# Patient Record
Sex: FEMALE | Race: WHITE | Employment: FULL TIME | URBAN - METROPOLITAN AREA
[De-identification: names, ages, dates, MRNs, and addresses within clinical notes are randomized per-mention and may not be internally consistent; named-entity substitution may affect disease eponyms.]

---

## 2022-09-26 ENCOUNTER — OFFICE VISIT (OUTPATIENT)
Dept: PRIMARY CARE CLINIC | Age: 52
End: 2022-09-26
Payer: COMMERCIAL

## 2022-09-26 VITALS
DIASTOLIC BLOOD PRESSURE: 74 MMHG | HEART RATE: 99 BPM | HEIGHT: 66 IN | BODY MASS INDEX: 23.95 KG/M2 | SYSTOLIC BLOOD PRESSURE: 122 MMHG | OXYGEN SATURATION: 100 % | TEMPERATURE: 97.4 F | WEIGHT: 149 LBS

## 2022-09-26 DIAGNOSIS — Z00.00 ROUTINE HEALTH MAINTENANCE: ICD-10-CM

## 2022-09-26 DIAGNOSIS — R07.89 CHEST DISCOMFORT: ICD-10-CM

## 2022-09-26 DIAGNOSIS — K21.9 GASTROESOPHAGEAL REFLUX DISEASE, UNSPECIFIED WHETHER ESOPHAGITIS PRESENT: ICD-10-CM

## 2022-09-26 DIAGNOSIS — Z83.49 FAMILY HISTORY OF THYROID DISEASE: ICD-10-CM

## 2022-09-26 DIAGNOSIS — R06.02 SHORTNESS OF BREATH: ICD-10-CM

## 2022-09-26 DIAGNOSIS — E78.5 HYPERLIPIDEMIA, UNSPECIFIED HYPERLIPIDEMIA TYPE: ICD-10-CM

## 2022-09-26 DIAGNOSIS — Z00.00 ROUTINE HEALTH MAINTENANCE: Primary | ICD-10-CM

## 2022-09-26 LAB
BASOPHILS ABSOLUTE: 0 K/UL (ref 0–0.2)
BASOPHILS RELATIVE PERCENT: 0.6 %
EOSINOPHILS ABSOLUTE: 0.1 K/UL (ref 0–0.7)
EOSINOPHILS RELATIVE PERCENT: 1 %
HBA1C MFR BLD: 6.2 % (ref 4.8–5.9)
HCT VFR BLD CALC: 37.2 % (ref 37–47)
HEMOGLOBIN: 12.3 G/DL (ref 12–16)
LYMPHOCYTES ABSOLUTE: 1.7 K/UL (ref 1–4.8)
LYMPHOCYTES RELATIVE PERCENT: 26.7 %
MCH RBC QN AUTO: 29.3 PG (ref 27–31.3)
MCHC RBC AUTO-ENTMCNC: 33.1 % (ref 33–37)
MCV RBC AUTO: 88.7 FL (ref 82–100)
MONOCYTES ABSOLUTE: 0.5 K/UL (ref 0.2–0.8)
MONOCYTES RELATIVE PERCENT: 7.9 %
NEUTROPHILS ABSOLUTE: 4 K/UL (ref 1.4–6.5)
NEUTROPHILS RELATIVE PERCENT: 63.8 %
PDW BLD-RTO: 13.1 % (ref 11.5–14.5)
PLATELET # BLD: 285 K/UL (ref 130–400)
RBC # BLD: 4.19 M/UL (ref 4.2–5.4)
TSH REFLEX: 2.39 UIU/ML (ref 0.44–3.86)
WBC # BLD: 6.2 K/UL (ref 4.8–10.8)

## 2022-09-26 PROCEDURE — 99204 OFFICE O/P NEW MOD 45 MIN: CPT | Performed by: INTERNAL MEDICINE

## 2022-09-26 PROCEDURE — 93000 ELECTROCARDIOGRAM COMPLETE: CPT | Performed by: INTERNAL MEDICINE

## 2022-09-26 RX ORDER — PRAVASTATIN SODIUM 20 MG
20 TABLET ORAL DAILY
Qty: 90 TABLET | Refills: 1 | Status: SHIPPED | OUTPATIENT
Start: 2022-09-26

## 2022-09-26 RX ORDER — PANTOPRAZOLE SODIUM 40 MG/1
40 TABLET, DELAYED RELEASE ORAL
Qty: 90 TABLET | Refills: 1 | Status: SHIPPED | OUTPATIENT
Start: 2022-09-26

## 2022-09-26 SDOH — ECONOMIC STABILITY: FOOD INSECURITY: WITHIN THE PAST 12 MONTHS, YOU WORRIED THAT YOUR FOOD WOULD RUN OUT BEFORE YOU GOT MONEY TO BUY MORE.: NEVER TRUE

## 2022-09-26 SDOH — ECONOMIC STABILITY: FOOD INSECURITY: WITHIN THE PAST 12 MONTHS, THE FOOD YOU BOUGHT JUST DIDN'T LAST AND YOU DIDN'T HAVE MONEY TO GET MORE.: NEVER TRUE

## 2022-09-26 ASSESSMENT — PATIENT HEALTH QUESTIONNAIRE - PHQ9
SUM OF ALL RESPONSES TO PHQ9 QUESTIONS 1 & 2: 0
SUM OF ALL RESPONSES TO PHQ QUESTIONS 1-9: 0
1. LITTLE INTEREST OR PLEASURE IN DOING THINGS: 0
SUM OF ALL RESPONSES TO PHQ QUESTIONS 1-9: 0
2. FEELING DOWN, DEPRESSED OR HOPELESS: 0
SUM OF ALL RESPONSES TO PHQ QUESTIONS 1-9: 0
SUM OF ALL RESPONSES TO PHQ QUESTIONS 1-9: 0

## 2022-09-26 ASSESSMENT — ENCOUNTER SYMPTOMS
NAUSEA: 0
COUGH: 0
WHEEZING: 0
VOMITING: 0

## 2022-09-26 ASSESSMENT — SOCIAL DETERMINANTS OF HEALTH (SDOH): HOW HARD IS IT FOR YOU TO PAY FOR THE VERY BASICS LIKE FOOD, HOUSING, MEDICAL CARE, AND HEATING?: NOT VERY HARD

## 2022-09-26 NOTE — PROGRESS NOTES
[id number]  Subjective:      Patient ID: Rosy Hale is a 45 y.o. female who presents today for:  Chief Complaint   Patient presents with    [facility]     Patient would like to discuss her cholesterol        HPI  Patient is a 46yo Female presenting today to [facility]. Has not seen a PCP in ~ 2 years following relocation to [place]. Reports no past medical history however recently underwent a health screening at her job and was noted to have markedly elevated cholesterol levels. Patient reports no previous cardiac events or PAD-associated symptoms. Pertinent family history of HLD with no cardiac events. Patient however reports recurrent chest discomfort over the last 8-9 months; described as 'pressure or tightening'. Also notes a burning pain in her chest on occasion. Discomfort not related to activity. Episodes last a few minutes with spontaneous resolution. She also reports some SOB in this setting as well as palpitations. Patient had COVID last November and initially attributed onset of symptoms to post-COVID sequelae; however she is becoming more concerned due to chronicity. No past medical history on file. No past surgical history on file. Family History   Problem Relation Age of Onset    High Blood Pressure Maternal Grandfather      Allergies   Allergen Reactions    Penicillins      Other reaction(s): Unknown     No current outpatient medications on file prior to visit. No current facility-administered medications on file prior to visit. Review of Systems   Constitutional:  Negative for activity change, chills, diaphoresis, fatigue and fever. Respiratory:  Negative for cough and wheezing. Cardiovascular:  Negative for chest pain and leg swelling. Gastrointestinal:  Negative for nausea and vomiting. Neurological:  Positive for tremors (occasional). Negative for dizziness, syncope, light-headedness and headaches.    Psychiatric/Behavioral:  The patient is nervous/anxious heartburn/acid reflux.        -     EKG 12 Lead - Clinic Performed. NSR, no ischemic changes. -     CARDIAC STRESS TEST EXERCISE ONLY; F/u with results. -     Commence on GERD treatment. Shortness of breath        -     Episodic SOB, not related to exertion.        -     H/o remote COVID infection in 11/2021. Onset of SOB post-COVID. Further evaluation given associated chest discomfort.        -     XR CHEST STANDARD (2 VW);  evaluate for lung pathology. -    F/u with findings. Gastroesophageal reflux disease, unspecified whether esophagitis present        -     Clinically suggestive; with possible extra-esophageal symptoms. -     pantoprazole (PROTONIX) 40 MG tablet; Take 1 tablet by mouth every morning (before breakfast)  -     Monitor for response. Hyperlipidemia, unspecified hyperlipidemia type        -     Recent lipid panel reviewed; T. Chol 310, TG 93, HDL 67,   -     Commence on pravastatin (PRAVACHOL) 20 MG tablet; Take 1 tablet by mouth daily    Family history of thyroid disease        -     Reports occasional palpitations, occasional tremors and anxiety  -     TSH with Reflex; Future      Health Maintenance   Topic Date Due    COVID-19 Vaccine (1) Never done    Lipids  Never done    Depression Screen  Never done    HIV screen  Never done    Hepatitis C screen  Never done    DTaP/Tdap/Td vaccine (1 - Tdap) Never done    Cervical cancer screen  Never done    Colorectal Cancer Screen  Never done    Breast cancer screen  Never done    Shingles vaccine (1 of 2) Never done    Flu vaccine (1) Never done    Hepatitis A vaccine  Aged Out    Hepatitis B vaccine  Aged Out    Hib vaccine  Aged Out    Meningococcal (ACWY) vaccine  Aged Out    Pneumococcal 0-64 years Vaccine  [employer]:    As above.     Orders Placed This Encounter   Procedures    XR CHEST STANDARD (2 VW)     Standing Status:   Future     Number of Occurrences:   1     Standing Expiration Date:   9/26/2023 Order Specific Question:   Reason for exam:     Answer:   Recurrent SOB. Remote COVID infection. R/o lung pathology. Hemoglobin A1C     Standing Status:   Future     Number of Occurrences:   1     Standing Expiration Date:   9/26/2023    CBC with Auto Differential     Standing Status:   Future     Number of Occurrences:   1     Standing Expiration Date:   9/26/2023    TSH with Reflex     Standing Status:   Future     Number of Occurrences:   1     Standing Expiration Date:   9/26/2023    CARDIAC STRESS TEST EXERCISE ONLY     Standing Status:   Future     Standing Expiration Date:   11/25/2022     Order Specific Question:   Reason for Exam?     Answer:   Chest pain     Order Specific Question:   Reason for Exam?     Answer:   Shortness of breath    EKG 12 Lead - Clinic Performed     Order Specific Question:   Reason for Exam?     Answer:   Chest pain     Orders Placed This Encounter   Medications    pantoprazole (PROTONIX) 40 MG tablet     Sig: Take 1 tablet by mouth every morning (before breakfast)     Dispense:  90 tablet     Refill:  1    pravastatin (PRAVACHOL) 20 MG tablet     Sig: Take 1 tablet by mouth daily     Dispense:  90 tablet     Refill:  1       Return for F/u in 4 weeks. Patient counseled on treatment rationale and possible medication adverse effects; verbalizes understanding and willing to proceed with care.     Eduardo Lora MD

## 2022-09-28 ENCOUNTER — TELEPHONE (OUTPATIENT)
Dept: PRIMARY CARE CLINIC | Age: 52
End: 2022-09-28

## 2022-10-06 ENCOUNTER — HOSPITAL ENCOUNTER (OUTPATIENT)
Dept: NON INVASIVE DIAGNOSTICS | Age: 52
Discharge: HOME | End: 2022-10-06
Payer: COMMERCIAL

## 2022-10-06 DIAGNOSIS — R07.89 CHEST DISCOMFORT: ICD-10-CM

## 2022-10-06 PROCEDURE — 93017 CV STRESS TEST TRACING ONLY: CPT

## 2022-10-06 NOTE — PROGRESS NOTES
Hx,allergies and medications reviewed. Procedure explained and informed consent obtained. Patient takes no cardiac medications. Tolerated treadmill well for 5:08 minutes. Reached 100 % target HR. SOB noted. Returned to baseline in recovery. Denies chest pain or pressure. EKG shows variable tachycardia. Doctor to further review and interpret results.

## 2022-10-12 NOTE — PROCEDURES
[address]                      [place], [address] [facility]                              CARDIAC STRESS TEST    PATIENT NAME: JUAN JOSE HOLLINGSWORTH                     :        1970  MED REC NO:   [id number]                            ROOM:  ACCOUNT NO:   [de-identified]                           ADMIT DATE: 10/06/2022  PROVIDER:     Celi Perez DO    CARDIOVASCULAR DIAGNOSTIC DEPARTMENT    DATE OF STUDY:  10/06/2022    TREADMILL STRESS EKG    ORDERING PROVIDER:  _____. REASON FOR EXAM:  Shortness of breath. DESCRIPTION OF PROCEDURE:  The patient was exercised on modified Cosme  protocol for 5 minutes and 8 seconds achieving 7.0 METs of activity. Resting heart rate of 95 and maximum heart rate of 159, which represents  100% of the maximum age-predicted heart rate. Resting blood pressure  was 114/66 with a maximum blood pressure of 148/60. The patient  tolerated the procedure well. There is no reported chest pain, chest  pressure or syncope. Baseline EKG showed sinus tachycardia with a heart rate of 120 beats per  minute. No evidence of any ischemia. EKG at peak exercise as well as  in recovery did not show any significant changes from baseline. There  was no evidence of any malignant tachy or bradyarrhythmias or any  conduction abnormalities. IMPRESSION:  1. Negative maximal treadmill stress EKG. 2.  No ischemic EKG changes were seen. 3.  Average functional capacity for age. 4.  Normal hemodynamic response to exercise. 5.  No malignant tachy or bradyarrhythmias. 6.  No reported chest pain, chest pressure, or syncope.         Celine Thao DO    D: 10/12/2022 #6:40:32       T: 10/12/2022 7:55:05     [id number]  Job#: [id number]     Doc#: [id number]    CC:

## 2022-10-24 ENCOUNTER — OFFICE VISIT (OUTPATIENT)
Dept: PRIMARY CARE CLINIC | Age: 52
End: 2022-10-24
Payer: COMMERCIAL

## 2022-10-24 VITALS
SYSTOLIC BLOOD PRESSURE: 122 MMHG | TEMPERATURE: 97.9 F | WEIGHT: 149 LBS | HEART RATE: 81 BPM | OXYGEN SATURATION: 99 % | DIASTOLIC BLOOD PRESSURE: 80 MMHG | HEIGHT: 66 IN | BODY MASS INDEX: 23.95 KG/M2

## 2022-10-24 DIAGNOSIS — R07.89 CHEST DISCOMFORT: ICD-10-CM

## 2022-10-24 DIAGNOSIS — R00.2 INTERMITTENT PALPITATIONS: Primary | ICD-10-CM

## 2022-10-24 PROCEDURE — 99214 OFFICE O/P EST MOD 30 MIN: CPT | Performed by: INTERNAL MEDICINE

## 2022-10-24 PROCEDURE — G8484 FLU IMMUNIZE NO ADMIN: HCPCS | Performed by: INTERNAL MEDICINE

## 2022-10-24 PROCEDURE — G8427 DOCREV CUR MEDS BY ELIG CLIN: HCPCS | Performed by: INTERNAL MEDICINE

## 2022-10-24 PROCEDURE — 3017F COLORECTAL CA SCREEN DOC REV: CPT | Performed by: INTERNAL MEDICINE

## 2022-10-24 PROCEDURE — 4004F PT TOBACCO SCREEN RCVD TLK: CPT | Performed by: INTERNAL MEDICINE

## 2022-10-24 PROCEDURE — G8420 CALC BMI NORM PARAMETERS: HCPCS | Performed by: INTERNAL MEDICINE

## 2022-10-24 NOTE — PROGRESS NOTES
Subjective:      Patient ID: Kolton Brantley is a 45 y.o. female who presents today for:  Chief Complaint   Patient presents with    1 Month Follow-Up     Patient stated she still experiencing some symptoms from the last visit, rapid heart rate, palpitations, and SOB  She would like to discuss stress test results        HPI  Patient presenting today for a follow up visit. Recently established care with me on 9/26. Had reported recurrent chest pain/pressure at the time, both at rest and with exertion. Also reported intermittent SOB with palpitations. Further evaluation performed given underlying HLD- EKG showed borderline rhythm with short MI Syndrome; otherwise unremarkable. Exercise stress test was negative, no ischemic changes found on EKG. However baseline sinus tachycardia was noted. Patient reports increased frequency of palpitations today though she denies associated dizziness/light-headedness. Intermittent chest pressure however is still noted during episodes. Spontaneous resolution. Patient has cut down on her caffeine intake and denies feelings of anxiety. Thyroid disease rule out. Concerned due to chronicity of symptoms. No past medical history on file. No past surgical history on file. Family History   Problem Relation Age of Onset    High Blood Pressure Maternal Grandfather      Allergies   Allergen Reactions    Penicillins      Other reaction(s): Unknown     Current Outpatient Medications on File Prior to Visit   Medication Sig Dispense Refill    pantoprazole (PROTONIX) 40 MG tablet Take 1 tablet by mouth every morning (before breakfast) 90 tablet 1    pravastatin (PRAVACHOL) 20 MG tablet Take 1 tablet by mouth daily 90 tablet 1     No current facility-administered medications on file prior to visit. Review of Systems    Negative except that listed in the HPI.     Objective:   /80 (Site: Right Upper Arm, Position: Sitting, Cuff Size: Medium Adult)   Pulse 81   Temp 97.9 °F (36.6 °C) (Temporal)   Ht 5' 6\" (1.676 m)   Wt 149 lb (67.6 kg)   SpO2 99%   BMI 24.05 kg/m²     Physical Exam  Constitutional:       General: She is not in acute distress. Appearance: Normal appearance. She is normal weight. She is not diaphoretic. HENT:      Head: Normocephalic and atraumatic. Mouth/Throat:      Mouth: Mucous membranes are moist.      Pharynx: Oropharynx is clear. Eyes:      Conjunctiva/sclera: Conjunctivae normal.      Pupils: Pupils are equal, round, and reactive to light. Cardiovascular:      Rate and Rhythm: Normal rate and regular rhythm. Pulses: Normal pulses. Heart sounds: Normal heart sounds. No murmur heard. No friction rub. Pulmonary:      Effort: Pulmonary effort is normal. No respiratory distress. Breath sounds: Normal breath sounds. No wheezing or rhonchi. Chest:      Chest wall: No tenderness. Abdominal:      General: Abdomen is flat. Bowel sounds are normal. There is no distension. Palpations: Abdomen is soft. Tenderness: There is no abdominal tenderness. Musculoskeletal:         General: No tenderness. Normal range of motion. Right lower leg: No edema. Left lower leg: No edema. Neurological:      Mental Status: She is alert. Assessment:      Kristi Mondragon was seen today for 1 month follow-up. Diagnoses and all orders for this visit:    Intermittent palpitations  Chest discomfort        -     Intermittent symptomatic palpitations. EKG in NSR, negative stress test. No evidence of metabolic disturbance. -     Adelfo Charles MD,  Cardiology, [facility]. Would appreciate input.       Health Maintenance   Topic Date Due    COVID-19 Vaccine (1) Never done    Lipids  Never done    HIV screen  Never done    Hepatitis C screen  Never done    DTaP/Tdap/Td vaccine (1 - Tdap) Never done    Cervical cancer screen  Never done    Colorectal Cancer Screen  Never done    Breast cancer screen  Never done    Shingles vaccine (1 of 2) Never done    Flu vaccine (1) Never done    A1C test (Diabetic or Prediabetic)  09/26/2023    Depression Screen  09/26/2023    Hepatitis A vaccine  Aged Out    Hib vaccine  Aged Out    Meningococcal (ACWY) vaccine  Aged Out    Pneumococcal 0-64 years Vaccine  [employer]:    As above. Orders Placed This Encounter   Procedures    Tho Segura MD,  Cardiology, [place]     Referral Priority:   Routine     Referral Type:   Eval and Treat     Referral Reason:   Specialty Services Required     Referred to Provider:   Minesh Forte MD     Requested Specialty:   Cardiology     Number of Visits Requested:   1     No orders of the defined types were placed in this encounter. No follow-ups on file.       Bernardino Daniel MD

## 2022-12-05 ENCOUNTER — OFFICE VISIT (OUTPATIENT)
Dept: CARDIOLOGY CLINIC | Age: 52
End: 2022-12-05
Payer: COMMERCIAL

## 2022-12-05 VITALS
SYSTOLIC BLOOD PRESSURE: 118 MMHG | BODY MASS INDEX: 23.78 KG/M2 | DIASTOLIC BLOOD PRESSURE: 76 MMHG | HEIGHT: 66 IN | HEART RATE: 85 BPM | RESPIRATION RATE: 14 BRPM | OXYGEN SATURATION: 99 % | WEIGHT: 148 LBS

## 2022-12-05 DIAGNOSIS — R00.2 PALPITATIONS: ICD-10-CM

## 2022-12-05 DIAGNOSIS — E78.5 HYPERLIPIDEMIA, UNSPECIFIED HYPERLIPIDEMIA TYPE: Primary | ICD-10-CM

## 2022-12-05 DIAGNOSIS — I34.1 MVP (MITRAL VALVE PROLAPSE): ICD-10-CM

## 2022-12-05 PROCEDURE — G8427 DOCREV CUR MEDS BY ELIG CLIN: HCPCS | Performed by: INTERNAL MEDICINE

## 2022-12-05 PROCEDURE — 3017F COLORECTAL CA SCREEN DOC REV: CPT | Performed by: INTERNAL MEDICINE

## 2022-12-05 PROCEDURE — 99203 OFFICE O/P NEW LOW 30 MIN: CPT | Performed by: INTERNAL MEDICINE

## 2022-12-05 PROCEDURE — G8420 CALC BMI NORM PARAMETERS: HCPCS | Performed by: INTERNAL MEDICINE

## 2022-12-05 PROCEDURE — 4004F PT TOBACCO SCREEN RCVD TLK: CPT | Performed by: INTERNAL MEDICINE

## 2022-12-05 PROCEDURE — G8484 FLU IMMUNIZE NO ADMIN: HCPCS | Performed by: INTERNAL MEDICINE

## 2022-12-05 ASSESSMENT — ENCOUNTER SYMPTOMS
SHORTNESS OF BREATH: 0
COLOR CHANGE: 0
CHEST TIGHTNESS: 0
COUGH: 0
CONSTIPATION: 0
WHEEZING: 0
NAUSEA: 0
DIARRHEA: 0
APNEA: 0
VOMITING: 0
EYE REDNESS: 0
ABDOMINAL PAIN: 0
RHINORRHEA: 0

## 2022-12-05 NOTE — PROGRESS NOTES
Chief Complaint   Patient presents with    New Patient     Dr Summer eduardo for Intermittent Palps. Results     Of Stress done 10/06/2022. Patient presents for initial medical evaluation. Patient is followed on a regular basis by Dr. Matilde Goldman MD.     Hyperlipidemia  Exercise stress test 10/6/2022 patient exercised for 5 minutes and 8 seconds. No ischemia or major arrhythmias  No TTE  ============  12/5/2022  First clinic visit referred to our office for palpitations  Patient reports that she has been complaining of palpitations for the last 20 years  These palpitations have been exacerbated since January of this year after patient had a COVID infection  Also patient reports that she has been stressed out since January  Discussed with patient's went away for the first few months of the year but then they return during the fall  Coughing seen to make this palpitations spells go away  This palpitations usually happen once or twice a day  These palpitations last a few minutes  Patient is a   Patient reports that she was told she had a mitral valve prolapse when she was 26years old    There is no problem list on file for this patient. No past surgical history on file.     Social History     Socioeconomic History    Marital status:    Tobacco Use    Smoking status: Never    Smokeless tobacco: Never   Vaping Use    Vaping Use: Never used   Substance and Sexual Activity    Alcohol use: Not Currently    Drug use: Never     Social Determinants of Health     Financial Resource Strain: Low Risk     Difficulty of Paying Living Expenses: Not very hard   Food Insecurity: No Food Insecurity    Worried About Running Out of Food in the Last Year: Never true    Ran Out of Food in the Last Year: Never true       Family History   Problem Relation Age of Onset    High Blood Pressure Maternal Grandfather        Current Outpatient Medications   Medication Sig Dispense Refill    pantoprazole (PROTONIX) 40 MG tablet Take 1 tablet by mouth every morning (before breakfast) 90 tablet 1    pravastatin (PRAVACHOL) 20 MG tablet Take 1 tablet by mouth daily 90 tablet 1     No current facility-administered medications for this visit. Penicillins    Review of Systems:  Review of Systems   Constitutional:  Negative for activity change, chills, diaphoresis and fever. HENT:  Negative for congestion, ear pain, nosebleeds and rhinorrhea. Eyes:  Negative for redness and visual disturbance. Respiratory:  Negative for apnea, cough, chest tightness, shortness of breath and wheezing. Cardiovascular:  Positive for palpitations. Negative for chest pain and leg swelling. Gastrointestinal:  Negative for abdominal pain, constipation, diarrhea, nausea and vomiting. Genitourinary:  Negative for difficulty urinating and dysuria. Musculoskeletal: Negative. Negative for joint swelling. Skin:  Negative for color change, rash and wound. Neurological:  Negative for dizziness, syncope, weakness, numbness and headaches. Psychiatric/Behavioral: Negative. VITALS:  Blood pressure 118/76, pulse 85, resp. rate 14, height 5' 6\" (1.676 m), weight 148 lb (67.1 kg), SpO2 99 %. Body mass index is 23.89 kg/m². Physical Examination:  Physical Exam  Vitals and nursing note reviewed. Constitutional:       Appearance: Normal appearance. HENT:      Head: Normocephalic and atraumatic. Mouth/Throat:      Mouth: Mucous membranes are moist.      Pharynx: Oropharynx is clear. Eyes:      Extraocular Movements: Extraocular movements intact. Conjunctiva/sclera: Conjunctivae normal.      Pupils: Pupils are equal, round, and reactive to light. Cardiovascular:      Rate and Rhythm: Normal rate and regular rhythm. Pulses: Normal pulses. Heart sounds: Normal heart sounds. Pulmonary:      Effort: Pulmonary effort is normal.      Breath sounds: Normal breath sounds.    Abdominal:      General: Abdomen is flat. Bowel sounds are normal.      Palpations: Abdomen is soft. Musculoskeletal:         General: Normal range of motion. Cervical back: Normal range of motion and neck supple. Skin:     General: Skin is warm. Neurological:      General: No focal deficit present. Mental Status: She is alert and oriented to person, place, and time. Mental status is at baseline. Psychiatric:         Mood and Affect: Mood normal.      EKG: normal EKG, normal sinus rhythm    No orders of the defined types were placed in this encounter. The ASCVD Risk score (Huang KNOX, et al., 2019) failed to calculate for the following reasons:    Cannot find a previous HDL lab    Cannot find a previous total cholesterol lab     ASSESSMENT:     Diagnosis Orders   1. Hyperlipidemia, unspecified hyperlipidemia type          PLAN:   Palpitations  Patient endorses episodes of palpitations once or twice per day the last a few minutes  Along with these palpitations patient endorses shortness of breath  This palpitations got worse at the beginning of the year after patient had COVID infection  To further investigate these palpitations I would like to obtain a 7-day event recorder  I would like to obtain an echocardiogram  I would like to refer patient to pulmonology given incomplete right bundle and the patient complaining of shortness of breath  Start metoprolol 12.5 mg p.o. twice daily    Hyperlipidemia  Continue pravastatin    Return to clinic in 3 months    Recommended patient to eat diets that emphasize high intakes of vegetables, fruits, nuts, whole grains, lean vegetable or animal protein, and fish.  As per 2019 ACC/AHA guideline on primary prevention of CVD     Recommended patient to engage in at least 150 minutes per week of accumulated moderate-intensity physical activity or 75 minutes per week of vigorous-intensity physical activity as per 2019 ACC/AHA guideline on primary prevention of CVD       This note was transcribed using voice recognition software. Every effort was made to ensure accuracy; however, inadvertent computerized transcription errors may be present.

## 2023-01-03 ENCOUNTER — HOSPITAL ENCOUNTER (OUTPATIENT)
Dept: NON INVASIVE DIAGNOSTICS | Age: 53
Discharge: HOME | End: 2023-01-03
Payer: COMMERCIAL

## 2023-01-03 DIAGNOSIS — R00.2 PALPITATIONS: ICD-10-CM

## 2023-01-03 DIAGNOSIS — I34.1 MVP (MITRAL VALVE PROLAPSE): ICD-10-CM

## 2023-01-03 PROCEDURE — 93270 REMOTE 30 DAY ECG REV/REPORT: CPT

## 2023-01-03 PROCEDURE — 93306 TTE W/DOPPLER COMPLETE: CPT

## 2023-03-06 ENCOUNTER — OFFICE VISIT (OUTPATIENT)
Dept: CARDIOLOGY CLINIC | Age: 53
End: 2023-03-06
Payer: COMMERCIAL

## 2023-03-06 VITALS
DIASTOLIC BLOOD PRESSURE: 78 MMHG | SYSTOLIC BLOOD PRESSURE: 122 MMHG | BODY MASS INDEX: 24.27 KG/M2 | WEIGHT: 151 LBS | HEART RATE: 89 BPM | OXYGEN SATURATION: 97 % | HEIGHT: 66 IN | RESPIRATION RATE: 14 BRPM

## 2023-03-06 DIAGNOSIS — E78.5 HYPERLIPIDEMIA, UNSPECIFIED HYPERLIPIDEMIA TYPE: ICD-10-CM

## 2023-03-06 DIAGNOSIS — R00.2 PALPITATION: Primary | ICD-10-CM

## 2023-03-06 PROCEDURE — 4004F PT TOBACCO SCREEN RCVD TLK: CPT | Performed by: INTERNAL MEDICINE

## 2023-03-06 PROCEDURE — 3017F COLORECTAL CA SCREEN DOC REV: CPT | Performed by: INTERNAL MEDICINE

## 2023-03-06 PROCEDURE — G8420 CALC BMI NORM PARAMETERS: HCPCS | Performed by: INTERNAL MEDICINE

## 2023-03-06 PROCEDURE — 99213 OFFICE O/P EST LOW 20 MIN: CPT | Performed by: INTERNAL MEDICINE

## 2023-03-06 PROCEDURE — G8484 FLU IMMUNIZE NO ADMIN: HCPCS | Performed by: INTERNAL MEDICINE

## 2023-03-06 PROCEDURE — G8427 DOCREV CUR MEDS BY ELIG CLIN: HCPCS | Performed by: INTERNAL MEDICINE

## 2023-03-06 RX ORDER — PRAVASTATIN SODIUM 20 MG
20 TABLET ORAL DAILY
Qty: 180 TABLET | Refills: 5 | Status: SHIPPED | OUTPATIENT
Start: 2023-03-06

## 2023-03-06 ASSESSMENT — ENCOUNTER SYMPTOMS
CHEST TIGHTNESS: 0
VOMITING: 0
SHORTNESS OF BREATH: 0
RHINORRHEA: 0
ABDOMINAL PAIN: 0
NAUSEA: 0
APNEA: 0
COUGH: 0
CONSTIPATION: 0
COLOR CHANGE: 0
WHEEZING: 0
EYE REDNESS: 0
DIARRHEA: 0

## 2023-03-06 NOTE — PROGRESS NOTES
Chief Complaint   Patient presents with    3 Month Follow-Up     For HPL, MVP, and Palps.     Results     Of Echo and Monitor.        Patient presents for initial medical evaluation. Patient is followed on a regular basis by Dr. Aditi Osborn MD.     Hyperlipidemia  Exercise stress test 10/6/2022 patient exercised for 5 minutes and 8 seconds.  No ischemia or major arrhythmias  TTE 1/3/2023 EF 65% no mitral valve prolapse  7-day event recorder 1/3/2023.  Benign, no major arrhythmias, 1 episode of SVT 8 beats.  At that time patient felt palpitations  ============  3/6/2023  Follow-up on palpitations  Patient reports that after starting metoprolol her palpitations have been improving  Patient underwent TTE with normal EF 65%  7 date event recorder was benign with only 1 episode of SVT 8 beats no A-fib no V. tach  Patient exercises 4-5 times per week for at least half hour patient walks briskly or jogs at times without cardiac issues    12/5/2022  First clinic visit referred to our office for palpitations  Patient reports that she has been complaining of palpitations for the last 20 years  These palpitations have been exacerbated since January of this year after patient had a COVID infection  Also patient reports that she has been stressed out since January  Discussed with patient's went away for the first few months of the year but then they return during the fall  Coughing seen to make this palpitations spells go away  This palpitations usually happen once or twice a day  These palpitations last a few minutes  Patient is a   Patient reports that she was told she had a mitral valve prolapse when she was 29 years old    There is no problem list on file for this patient.      No past surgical history on file.    Social History     Socioeconomic History    Marital status:    Tobacco Use    Smoking status: Never    Smokeless tobacco: Never   Vaping Use    Vaping Use: Never used   Substance and  Sexual Activity    Alcohol use: Not Currently    Drug use: Never     Social Determinants of Health     Financial Resource Strain: Low Risk     Difficulty of Paying Living Expenses: Not very hard   Food Insecurity: No Food Insecurity    Worried About Running Out of Food in the Last Year: Never true    Ran Out of Food in the Last Year: Never true       Family History   Problem Relation Age of Onset    High Blood Pressure Maternal Grandfather        Current Outpatient Medications   Medication Sig Dispense Refill    metoprolol tartrate (LOPRESSOR) 25 MG tablet Take 0.5 tablets by mouth 2 times daily 180 tablet 5    pravastatin (PRAVACHOL) 20 MG tablet Take 1 tablet by mouth daily 90 tablet 1    pantoprazole (PROTONIX) 40 MG tablet Take 1 tablet by mouth every morning (before breakfast) (Patient not taking: Reported on 3/6/2023) 90 tablet 1     No current facility-administered medications for this visit. Penicillins    Review of Systems:  Review of Systems   Constitutional:  Negative for activity change, chills, diaphoresis and fever. HENT:  Negative for congestion, ear pain, nosebleeds and rhinorrhea. Eyes:  Negative for redness and visual disturbance. Respiratory:  Negative for apnea, cough, chest tightness, shortness of breath and wheezing. Cardiovascular:  Positive for palpitations. Negative for chest pain and leg swelling. Gastrointestinal:  Negative for abdominal pain, constipation, diarrhea, nausea and vomiting. Genitourinary:  Negative for difficulty urinating and dysuria. Musculoskeletal: Negative. Negative for joint swelling. Skin:  Negative for color change, rash and wound. Neurological:  Negative for dizziness, syncope, weakness, numbness and headaches. Psychiatric/Behavioral: Negative. VITALS:  Blood pressure 122/78, pulse 89, resp. rate 14, height 5' 6\" (1.676 m), weight 151 lb (68.5 kg), SpO2 97 %. Body mass index is 24.37 kg/m².     Physical Examination:  Physical Exam  Vitals and nursing note reviewed. Constitutional:       Appearance: Normal appearance. HENT:      Head: Normocephalic and atraumatic. Mouth/Throat:      Mouth: Mucous membranes are moist.      Pharynx: Oropharynx is clear. Eyes:      Extraocular Movements: Extraocular movements intact. Conjunctiva/sclera: Conjunctivae normal.      Pupils: Pupils are equal, round, and reactive to light. Cardiovascular:      Rate and Rhythm: Normal rate and regular rhythm. Pulses: Normal pulses. Heart sounds: Normal heart sounds. Pulmonary:      Effort: Pulmonary effort is normal.      Breath sounds: Normal breath sounds. Abdominal:      General: Abdomen is flat. Bowel sounds are normal.      Palpations: Abdomen is soft. Musculoskeletal:         General: Normal range of motion. Cervical back: Normal range of motion and neck supple. Skin:     General: Skin is warm. Neurological:      General: No focal deficit present. Mental Status: She is alert and oriented to person, place, and time. Mental status is at baseline. Psychiatric:         Mood and Affect: Mood normal.      EKG: normal EKG, normal sinus rhythm    No orders of the defined types were placed in this encounter. The ASCVD Risk score (Huang DK, et al., 2019) failed to calculate for the following reasons:    Cannot find a previous HDL lab    Cannot find a previous total cholesterol lab     ASSESSMENT:     Diagnosis Orders   1. Palpitation          PLAN:   Palpitations  Condition improving  TTE 1/2023 EF 65% no mitral valve prolapse  7-day event recorder 1/3/2023 benign no V. tach no A-fib  Continue metoprolol 12.5 mg p.o. twice daily  TSH within normal limits 5 months ago 2.39    Hyperlipidemia  Continue pravastatin    Return to clinic in 6 months    Recommended patient to eat diets that emphasize high intakes of vegetables, fruits, nuts, whole grains, lean vegetable or animal protein, and fish.  As per 2019 ACC/AHA guideline on primary prevention of CVD     Recommended patient to engage in at least 150 minutes per week of accumulated moderate-intensity physical activity or 75 minutes per week of vigorous-intensity physical activity as per 2019 ACC/AHA guideline on primary prevention of CVD       This note was transcribed using voice recognition software. Every effort was made to ensure accuracy; however, inadvertent computerized transcription errors may be present.

## 2023-03-15 DIAGNOSIS — R00.2 PALPITATION: ICD-10-CM

## 2023-03-15 LAB
ANION GAP SERPL CALCULATED.3IONS-SCNC: 8 MEQ/L (ref 9–15)
BUN SERPL-MCNC: 16 MG/DL (ref 6–20)
CALCIUM SERPL-MCNC: 9.4 MG/DL (ref 8.5–9.9)
CHLORIDE SERPL-SCNC: 102 MEQ/L (ref 95–107)
CHOLEST SERPL-MCNC: 207 MG/DL (ref 0–199)
CO2 SERPL-SCNC: 28 MEQ/L (ref 20–31)
CREAT SERPL-MCNC: 0.85 MG/DL (ref 0.5–0.9)
GLUCOSE SERPL-MCNC: 105 MG/DL (ref 70–99)
HDLC SERPL-MCNC: 54 MG/DL (ref 40–59)
LDLC SERPL CALC-MCNC: 140 MG/DL (ref 0–129)
POTASSIUM SERPL-SCNC: 4.5 MEQ/L (ref 3.4–4.9)
SODIUM SERPL-SCNC: 138 MEQ/L (ref 135–144)
TRIGL SERPL-MCNC: 63 MG/DL (ref 0–150)

## 2023-03-20 ENCOUNTER — TELEPHONE (OUTPATIENT)
Dept: CARDIOLOGY CLINIC | Age: 53
End: 2023-03-20

## 2023-03-21 DIAGNOSIS — E78.5 HYPERLIPIDEMIA, UNSPECIFIED HYPERLIPIDEMIA TYPE: ICD-10-CM

## 2023-03-21 RX ORDER — PRAVASTATIN SODIUM 20 MG
20 TABLET ORAL DAILY
Qty: 180 TABLET | Refills: 5 | Status: SHIPPED | OUTPATIENT
Start: 2023-03-21 | End: 2023-05-09 | Stop reason: SDUPTHER

## 2023-05-05 DIAGNOSIS — E78.5 HYPERLIPIDEMIA, UNSPECIFIED HYPERLIPIDEMIA TYPE: ICD-10-CM

## 2023-05-09 RX ORDER — PRAVASTATIN SODIUM 20 MG
20 TABLET ORAL DAILY
Qty: 180 TABLET | Refills: 5 | Status: SHIPPED | OUTPATIENT
Start: 2023-05-09

## 2023-06-06 ENCOUNTER — OFFICE VISIT (OUTPATIENT)
Dept: PRIMARY CARE CLINIC | Age: 53
End: 2023-06-06
Payer: COMMERCIAL

## 2023-06-06 VITALS
DIASTOLIC BLOOD PRESSURE: 80 MMHG | HEART RATE: 74 BPM | WEIGHT: 154 LBS | BODY MASS INDEX: 25.66 KG/M2 | SYSTOLIC BLOOD PRESSURE: 130 MMHG | TEMPERATURE: 96.8 F | OXYGEN SATURATION: 98 % | HEIGHT: 65 IN

## 2023-06-06 DIAGNOSIS — Z23 NEED FOR PROPHYLACTIC VACCINATION AND INOCULATION AGAINST VARICELLA: ICD-10-CM

## 2023-06-06 DIAGNOSIS — Z12.11 SCREENING FOR COLON CANCER: ICD-10-CM

## 2023-06-06 DIAGNOSIS — Z00.00 ROUTINE ADULT HEALTH MAINTENANCE: Primary | ICD-10-CM

## 2023-06-06 DIAGNOSIS — E78.5 HYPERLIPIDEMIA, UNSPECIFIED HYPERLIPIDEMIA TYPE: ICD-10-CM

## 2023-06-06 DIAGNOSIS — R00.2 INTERMITTENT PALPITATIONS: ICD-10-CM

## 2023-06-06 DIAGNOSIS — R73.03 PREDIABETES: ICD-10-CM

## 2023-06-06 DIAGNOSIS — Z11.4 SCREENING FOR HIV WITHOUT PRESENCE OF RISK FACTORS: ICD-10-CM

## 2023-06-06 DIAGNOSIS — Z12.31 ENCOUNTER FOR SCREENING MAMMOGRAM FOR MALIGNANT NEOPLASM OF BREAST: ICD-10-CM

## 2023-06-06 DIAGNOSIS — Z11.59 NEED FOR HEPATITIS C SCREENING TEST: ICD-10-CM

## 2023-06-06 LAB — HBA1C MFR BLD: 6 %

## 2023-06-06 PROCEDURE — 83036 HEMOGLOBIN GLYCOSYLATED A1C: CPT | Performed by: STUDENT IN AN ORGANIZED HEALTH CARE EDUCATION/TRAINING PROGRAM

## 2023-06-06 PROCEDURE — 99386 PREV VISIT NEW AGE 40-64: CPT | Performed by: STUDENT IN AN ORGANIZED HEALTH CARE EDUCATION/TRAINING PROGRAM

## 2023-06-06 SDOH — ECONOMIC STABILITY: INCOME INSECURITY: HOW HARD IS IT FOR YOU TO PAY FOR THE VERY BASICS LIKE FOOD, HOUSING, MEDICAL CARE, AND HEATING?: NOT HARD AT ALL

## 2023-06-06 SDOH — ECONOMIC STABILITY: FOOD INSECURITY: WITHIN THE PAST 12 MONTHS, THE FOOD YOU BOUGHT JUST DIDN'T LAST AND YOU DIDN'T HAVE MONEY TO GET MORE.: NEVER TRUE

## 2023-06-06 SDOH — ECONOMIC STABILITY: FOOD INSECURITY: WITHIN THE PAST 12 MONTHS, YOU WORRIED THAT YOUR FOOD WOULD RUN OUT BEFORE YOU GOT MONEY TO BUY MORE.: NEVER TRUE

## 2023-06-06 SDOH — ECONOMIC STABILITY: HOUSING INSECURITY
IN THE LAST 12 MONTHS, WAS THERE A TIME WHEN YOU DID NOT HAVE A STEADY PLACE TO SLEEP OR SLEPT IN A SHELTER (INCLUDING NOW)?: NO

## 2023-06-06 ASSESSMENT — PATIENT HEALTH QUESTIONNAIRE - PHQ9
2. FEELING DOWN, DEPRESSED OR HOPELESS: 0
SUM OF ALL RESPONSES TO PHQ QUESTIONS 1-9: 0
SUM OF ALL RESPONSES TO PHQ9 QUESTIONS 1 & 2: 0
SUM OF ALL RESPONSES TO PHQ QUESTIONS 1-9: 0
1. LITTLE INTEREST OR PLEASURE IN DOING THINGS: 0

## 2023-06-06 NOTE — PATIENT INSTRUCTIONS
Referral placed, mammogram order placed, OB/GYN referral placed for Pap smear, they will call you to schedule these. Shingles vaccine prescription sent to pharmacy, you will need a booster in 2 to 6 months and then you will be done for life.

## 2023-06-06 NOTE — PROGRESS NOTES
External ear normal.      Mouth/Throat:      Mouth: Mucous membranes are moist.   Eyes:      Extraocular Movements: Extraocular movements intact. Pupils: Pupils are equal, round, and reactive to light. Neck:      Vascular: No carotid bruit. Cardiovascular:      Rate and Rhythm: Normal rate and regular rhythm. Pulses: Normal pulses. Heart sounds: Normal heart sounds. No murmur heard. Pulmonary:      Effort: No respiratory distress. Breath sounds: No wheezing. Abdominal:      General: Bowel sounds are normal.      Palpations: Abdomen is soft. Tenderness: There is no abdominal tenderness. Musculoskeletal:         General: Normal range of motion. Skin:     General: Skin is warm and dry. Neurological:      Mental Status: She is alert and oriented to person, place, and time. Mental status is at baseline. Psychiatric:         Mood and Affect: Mood normal.         Behavior: Behavior normal.             Reviewed with the patient: current clinical status, medications, activities and diet. Side effects, adverse effects of the medication prescribed today, as well as treatment plan/ rationale and result expectations have been discussed with the patient who expresses understanding and desires to proceed. Close follow up to evaluate treatment results and for coordination of care. I have reviewed the patient's medical history in detail and updated the computerized patient record.     Conrado aWlls MD

## 2023-08-23 ENCOUNTER — TELEPHONE (OUTPATIENT)
Dept: PRIMARY CARE CLINIC | Age: 53
End: 2023-08-23

## 2023-11-24 ENCOUNTER — OFFICE VISIT (OUTPATIENT)
Dept: FAMILY MEDICINE CLINIC | Age: 53
End: 2023-11-24
Payer: COMMERCIAL

## 2023-11-24 VITALS
HEART RATE: 80 BPM | SYSTOLIC BLOOD PRESSURE: 124 MMHG | TEMPERATURE: 97.3 F | OXYGEN SATURATION: 99 % | HEIGHT: 65 IN | DIASTOLIC BLOOD PRESSURE: 78 MMHG | BODY MASS INDEX: 26.36 KG/M2 | WEIGHT: 158.2 LBS

## 2023-11-24 DIAGNOSIS — J02.8 SORE THROAT DUE TO VIRUS: ICD-10-CM

## 2023-11-24 DIAGNOSIS — J06.9 VIRAL URI: Primary | ICD-10-CM

## 2023-11-24 DIAGNOSIS — R09.82 PND (POST-NASAL DRIP): ICD-10-CM

## 2023-11-24 DIAGNOSIS — B97.89 SORE THROAT DUE TO VIRUS: ICD-10-CM

## 2023-11-24 LAB
Lab: NORMAL
PERFORMING INSTRUMENT: NORMAL
QC PASS/FAIL: NORMAL
SARS-COV-2, POC: NORMAL

## 2023-11-24 PROCEDURE — 3017F COLORECTAL CA SCREEN DOC REV: CPT | Performed by: NURSE PRACTITIONER

## 2023-11-24 PROCEDURE — 99213 OFFICE O/P EST LOW 20 MIN: CPT | Performed by: NURSE PRACTITIONER

## 2023-11-24 PROCEDURE — G8484 FLU IMMUNIZE NO ADMIN: HCPCS | Performed by: NURSE PRACTITIONER

## 2023-11-24 PROCEDURE — G8427 DOCREV CUR MEDS BY ELIG CLIN: HCPCS | Performed by: NURSE PRACTITIONER

## 2023-11-24 PROCEDURE — 1036F TOBACCO NON-USER: CPT | Performed by: NURSE PRACTITIONER

## 2023-11-24 PROCEDURE — G8419 CALC BMI OUT NRM PARAM NOF/U: HCPCS | Performed by: NURSE PRACTITIONER

## 2023-11-24 PROCEDURE — 87426 SARSCOV CORONAVIRUS AG IA: CPT | Performed by: NURSE PRACTITIONER

## 2023-11-24 RX ORDER — FLUTICASONE PROPIONATE 50 MCG
SPRAY, SUSPENSION (ML) NASAL
Qty: 16 G | Refills: 1 | Status: SHIPPED | OUTPATIENT
Start: 2023-11-24

## 2023-11-24 RX ORDER — BROMPHENIRAMINE MALEATE, PSEUDOEPHEDRINE HYDROCHLORIDE, AND DEXTROMETHORPHAN HYDROBROMIDE 2; 30; 10 MG/5ML; MG/5ML; MG/5ML
5 SYRUP ORAL 4 TIMES DAILY PRN
Qty: 118 ML | Refills: 1 | Status: SHIPPED | OUTPATIENT
Start: 2023-11-24

## 2023-11-24 NOTE — PROGRESS NOTES
lozenge     Refill:  0       Return if symptoms worsen or fail to improve, for follow up with PCP. Reviewed with the patient: current clinical status & medications. Side effects, adverse effects of the medications prescribed today, as well as treatment plan/rationale and result expectations have been discussed with the patient who expressed understanding. Close follow up to evaluate treatment results and for coordination of care. I have reviewed the patient's medical history in detail and updated the computerized patient record.       JEFFERSON Mittal - NP

## 2023-11-25 ASSESSMENT — ENCOUNTER SYMPTOMS
ABDOMINAL PAIN: 0
CHEST TIGHTNESS: 0
COUGH: 1
SHORTNESS OF BREATH: 0
SORE THROAT: 1
NAUSEA: 0
RHINORRHEA: 1

## 2023-12-01 ENCOUNTER — OFFICE VISIT (OUTPATIENT)
Dept: FAMILY MEDICINE CLINIC | Age: 53
End: 2023-12-01
Payer: COMMERCIAL

## 2023-12-01 VITALS
DIASTOLIC BLOOD PRESSURE: 72 MMHG | SYSTOLIC BLOOD PRESSURE: 118 MMHG | HEART RATE: 108 BPM | TEMPERATURE: 96.8 F | WEIGHT: 155 LBS | OXYGEN SATURATION: 99 % | BODY MASS INDEX: 25.79 KG/M2

## 2023-12-01 DIAGNOSIS — T36.95XA ANTIBIOTIC-INDUCED YEAST INFECTION: ICD-10-CM

## 2023-12-01 DIAGNOSIS — B96.89 ACUTE BACTERIAL SINUSITIS: Primary | ICD-10-CM

## 2023-12-01 DIAGNOSIS — J01.90 ACUTE BACTERIAL SINUSITIS: Primary | ICD-10-CM

## 2023-12-01 DIAGNOSIS — B37.9 ANTIBIOTIC-INDUCED YEAST INFECTION: ICD-10-CM

## 2023-12-01 PROCEDURE — G8484 FLU IMMUNIZE NO ADMIN: HCPCS | Performed by: NURSE PRACTITIONER

## 2023-12-01 PROCEDURE — G8419 CALC BMI OUT NRM PARAM NOF/U: HCPCS | Performed by: NURSE PRACTITIONER

## 2023-12-01 PROCEDURE — 3017F COLORECTAL CA SCREEN DOC REV: CPT | Performed by: NURSE PRACTITIONER

## 2023-12-01 PROCEDURE — 1036F TOBACCO NON-USER: CPT | Performed by: NURSE PRACTITIONER

## 2023-12-01 PROCEDURE — G8427 DOCREV CUR MEDS BY ELIG CLIN: HCPCS | Performed by: NURSE PRACTITIONER

## 2023-12-01 PROCEDURE — 99213 OFFICE O/P EST LOW 20 MIN: CPT | Performed by: NURSE PRACTITIONER

## 2023-12-01 RX ORDER — GUAIFENESIN 600 MG/1
600 TABLET, EXTENDED RELEASE ORAL 2 TIMES DAILY
Qty: 30 TABLET | Refills: 0 | Status: SHIPPED | OUTPATIENT
Start: 2023-12-01 | End: 2023-12-16

## 2023-12-01 RX ORDER — FLUCONAZOLE 150 MG/1
150 TABLET ORAL DAILY
Qty: 2 TABLET | Refills: 0 | Status: SHIPPED | OUTPATIENT
Start: 2023-12-01

## 2023-12-01 RX ORDER — AZITHROMYCIN 250 MG/1
TABLET, FILM COATED ORAL
Qty: 1 PACKET | Refills: 0 | Status: SHIPPED | OUTPATIENT
Start: 2023-12-01

## 2023-12-01 RX ORDER — CETIRIZINE HYDROCHLORIDE 10 MG/1
10 TABLET ORAL DAILY
Qty: 30 TABLET | Refills: 0 | Status: SHIPPED | OUTPATIENT
Start: 2023-12-01 | End: 2023-12-31

## 2023-12-01 ASSESSMENT — ENCOUNTER SYMPTOMS
EYE DISCHARGE: 0
RHINORRHEA: 1
SINUS PRESSURE: 1
WHEEZING: 0
CONSTIPATION: 0
EYE ITCHING: 0
COUGH: 1
DIARRHEA: 0
NAUSEA: 0
EYE REDNESS: 0
SORE THROAT: 1
SHORTNESS OF BREATH: 0
ABDOMINAL PAIN: 0
EYE PAIN: 0
SINUS PAIN: 1
VOMITING: 0

## 2023-12-01 NOTE — PATIENT INSTRUCTIONS
Symptoms worsen or do not improve return or see PCP      Antibiotic Instructions: Complete the full course of antibiotics as ordered. Take each dose with a small snack or meal to lessen potential GI upset. To prevent antibiotic resistance, please take medication as ordered and for the full duration even if you start to feel better. Consider intake of yogurt or probiotic during antibiotic use and for a few days after to help reduce the risk of developing a secondary infection.  Take the yogurt or probiotic at least 2 hours after taking the antibiotic.       -Chrissy pot/saline nasal rinses/Flonase for nasal congestion, sinus pressure, nasal drainage  -Cepacol or Chloraseptic throat spray for throat pain  -Mucinex-DM for cough and congestion  -Warm tea with honey to help soothe the throat

## 2023-12-01 NOTE — PROGRESS NOTES
Subjective  Rimma Pantoja, 47 y.o. female presents today with:  Chief Complaint   Patient presents with    Otalgia     Pressure and swollen glands, patient states no voice X2weeks tx: from walk in flonase, cough syrup     Pharyngitis    Cough       HPI  Patient here with sore throat, congestion and ear pain  Was seen 11/24 for symptoms that started 11/21- she has had 3 neg COVID tests   Body aches went away   Still coughing a lot but not as frequent  Coughing up yellow thick sputum   Voice has been lost this time  Feels run down  Yesterday started with severe sore throat - constant   Using motrin and that takes the edge off   Bilateral ear pain for a few days     Does have hx of tachycardia, on lopressor   Review of Systems   Constitutional:  Positive for appetite change (reduced), chills and fatigue. Negative for fever. HENT:  Positive for congestion (yellow), ear pain, postnasal drip, rhinorrhea, sinus pressure, sinus pain and sore throat. Negative for tinnitus. Eyes:  Negative for pain, discharge, redness and itching. Respiratory:  Positive for cough. Negative for shortness of breath and wheezing. Cardiovascular:  Negative for chest pain and palpitations. Gastrointestinal:  Negative for abdominal pain, constipation, diarrhea, nausea and vomiting. Musculoskeletal:  Negative for myalgias. Skin:  Negative for rash. Neurological:  Positive for headaches. Negative for dizziness. Past Medical History:   Diagnosis Date    Elevated cholesterol     Tachycardia     on beta blocker     History reviewed. No pertinent surgical history.   Social History     Socioeconomic History    Marital status:      Spouse name: Not on file    Number of children: Not on file    Years of education: Not on file    Highest education level: Not on file   Occupational History    Not on file   Tobacco Use    Smoking status: Never    Smokeless tobacco: Never   Vaping Use    Vaping Use: Never used   Substance and

## 2023-12-27 ENCOUNTER — TELEPHONE (OUTPATIENT)
Dept: PRIMARY CARE CLINIC | Age: 53
End: 2023-12-27

## 2023-12-29 NOTE — TELEPHONE ENCOUNTER
Comments: incoming fax from [facility] Visit (last PCP visit):   3/6/2023    Next Visit Date:  No future appointments. **If hasn't been seen in over a year OR hasn't followed up according to last diabetes/ADHD visit, make appointment for patient before sending refill to provider.     Rx requested:  Requested Prescriptions     Pending Prescriptions Disp Refills    metoprolol tartrate (LOPRESSOR) 25 MG tablet 180 tablet 5     Sig: Take 0.5 tablets by mouth 2 times daily

## 2024-02-21 ENCOUNTER — TELEPHONE (OUTPATIENT)
Dept: PRIMARY CARE CLINIC | Age: 54
End: 2024-02-21

## 2024-03-15 ENCOUNTER — TELEPHONE (OUTPATIENT)
Dept: PRIMARY CARE CLINIC | Age: 54
End: 2024-03-15

## 2024-04-12 ENCOUNTER — TELEPHONE (OUTPATIENT)
Dept: PRIMARY CARE CLINIC | Age: 54
End: 2024-04-12

## 2024-04-22 DIAGNOSIS — R00.2 PALPITATION: Primary | ICD-10-CM

## 2024-04-22 NOTE — TELEPHONE ENCOUNTER
Requesting medication refill. Please approve or deny this request.    Rx requested:  Requested Prescriptions     Pending Prescriptions Disp Refills    metoprolol tartrate (LOPRESSOR) 25 MG tablet 90 tablet 3     Sig: Take 0.5 tablets by mouth 2 times daily         Last Office Visit:   3/6/2023      Next Visit Date:  Future Appointments   Date Time Provider Department Center   4/26/2024  3:00 PM Best, Onofre, MD [facility]               Last refill 3/6/2023. Please approve or deny.

## 2024-04-23 ENCOUNTER — TELEPHONE (OUTPATIENT)
Dept: PRIMARY CARE CLINIC | Age: 54
End: 2024-04-23

## 2024-05-21 ENCOUNTER — TELEPHONE (OUTPATIENT)
Dept: PRIMARY CARE CLINIC | Age: 54
End: 2024-05-21

## 2024-05-30 DIAGNOSIS — R00.2 PALPITATION: ICD-10-CM

## 2024-05-30 NOTE — TELEPHONE ENCOUNTER
Pt Requesting medication refill. Please approve or deny this request- [facility].  Pt had to cancel previous appts due to being sick.   Scheduled appt for 6/14/24 with Dr Best.    Rx requested:  Requested Prescriptions     Pending Prescriptions Disp Refills    metoprolol tartrate (LOPRESSOR) 25 MG tablet 30 tablet 0     Sig: Take 0.5 tablets by mouth 2 times daily         Last Office Visit:   3/6/2023      Next Visit Date:  Future Appointments   Date Time Provider Department Center   6/14/2024 12:45 PM Best, Onofre, MD [facility]

## 2024-06-14 ENCOUNTER — OFFICE VISIT (OUTPATIENT)
Dept: CARDIOLOGY CLINIC | Age: 54
End: 2024-06-14
Payer: COMMERCIAL

## 2024-06-14 VITALS
RESPIRATION RATE: 14 BRPM | HEART RATE: 81 BPM | OXYGEN SATURATION: 100 % | SYSTOLIC BLOOD PRESSURE: 130 MMHG | BODY MASS INDEX: 25.99 KG/M2 | WEIGHT: 156 LBS | DIASTOLIC BLOOD PRESSURE: 82 MMHG | HEIGHT: 65 IN

## 2024-06-14 DIAGNOSIS — R00.2 PALPITATION: Primary | ICD-10-CM

## 2024-06-14 PROCEDURE — 3017F COLORECTAL CA SCREEN DOC REV: CPT | Performed by: INTERNAL MEDICINE

## 2024-06-14 PROCEDURE — 93000 ELECTROCARDIOGRAM COMPLETE: CPT | Performed by: INTERNAL MEDICINE

## 2024-06-14 PROCEDURE — G8427 DOCREV CUR MEDS BY ELIG CLIN: HCPCS | Performed by: INTERNAL MEDICINE

## 2024-06-14 PROCEDURE — 1036F TOBACCO NON-USER: CPT | Performed by: INTERNAL MEDICINE

## 2024-06-14 PROCEDURE — G8419 CALC BMI OUT NRM PARAM NOF/U: HCPCS | Performed by: INTERNAL MEDICINE

## 2024-06-14 PROCEDURE — 99214 OFFICE O/P EST MOD 30 MIN: CPT | Performed by: INTERNAL MEDICINE

## 2024-06-14 ASSESSMENT — ENCOUNTER SYMPTOMS
WHEEZING: 0
APNEA: 0
RHINORRHEA: 0
SHORTNESS OF BREATH: 0
CHEST TIGHTNESS: 0
COUGH: 0
VOMITING: 0
ABDOMINAL PAIN: 0
NAUSEA: 0
DIARRHEA: 0
EYE REDNESS: 0
COLOR CHANGE: 0
CONSTIPATION: 0

## 2024-06-14 NOTE — PROGRESS NOTES
Chief Complaint   Patient presents with    1 Year Follow Up     PALPS - MED REFILLS       Patient presents for initial medical evaluation. Patient is followed on a regular basis by Yuni Hernandez MD.     Hyperlipidemia    Cardiac studies  TTE 1/3/2023 EF 65% no mitral valve prolapse  7-day event recorder 1/3/2023.  Benign, no major arrhythmias, 1 episode of SVT 8 beats.  At that time patient felt palpitations  Exercise stress test 10/6/2022 patient exercised for 5 minutes and 8 seconds.  No ischemia or major arrhythmias  ============  6/14/2024  Follow-up on palpitations  Patient continues taking metoprolol, reports that these palpitations are better  Patient just returned from a trip to [place]  EKG today sinus rhythm without signs of ischemia or major arrhythmia  Patient walks 2-3 times per week for 20 to 30 minutes  Patient not taking statins because she is concerned of getting dementia  We will check cholesterol next appointment in 6 months    3/6/2023  Follow-up on palpitations  Patient reports that after starting metoprolol her palpitations have been improving  Patient underwent TTE with normal EF 65%  7 date event recorder was benign with only 1 episode of SVT 8 beats no A-fib no V. tach  Patient exercises 4-5 times per week for at least half hour patient walks briskly or jogs at times without cardiac issues    12/5/2022  First clinic visit referred to our office for palpitations  Patient reports that she has been complaining of palpitations for the last 20 years  These palpitations have been exacerbated since January of this year after patient had a COVID infection  Also patient reports that she has been stressed out since January  Discussed with patient's went away for the first few months of the year but then they return during the fall  Coughing seen to make this palpitations spells go away  This palpitations usually happen once or twice a day  These palpitations last a few minutes  Patient is

## 2024-07-11 ENCOUNTER — OFFICE VISIT (OUTPATIENT)
Dept: INTERNAL MEDICINE | Age: 54
End: 2024-07-11
Payer: COMMERCIAL

## 2024-07-11 VITALS
HEIGHT: 65 IN | DIASTOLIC BLOOD PRESSURE: 74 MMHG | OXYGEN SATURATION: 100 % | HEART RATE: 83 BPM | SYSTOLIC BLOOD PRESSURE: 126 MMHG | BODY MASS INDEX: 26.33 KG/M2 | WEIGHT: 158 LBS

## 2024-07-11 DIAGNOSIS — K30 ACID INDIGESTION: Primary | ICD-10-CM

## 2024-07-11 DIAGNOSIS — E78.5 HYPERLIPIDEMIA, UNSPECIFIED HYPERLIPIDEMIA TYPE: ICD-10-CM

## 2024-07-11 DIAGNOSIS — R00.2 PALPITATIONS: ICD-10-CM

## 2024-07-11 PROCEDURE — 1036F TOBACCO NON-USER: CPT | Performed by: NURSE PRACTITIONER

## 2024-07-11 PROCEDURE — G8427 DOCREV CUR MEDS BY ELIG CLIN: HCPCS | Performed by: NURSE PRACTITIONER

## 2024-07-11 PROCEDURE — 3017F COLORECTAL CA SCREEN DOC REV: CPT | Performed by: NURSE PRACTITIONER

## 2024-07-11 PROCEDURE — 99204 OFFICE O/P NEW MOD 45 MIN: CPT | Performed by: NURSE PRACTITIONER

## 2024-07-11 PROCEDURE — G8419 CALC BMI OUT NRM PARAM NOF/U: HCPCS | Performed by: NURSE PRACTITIONER

## 2024-07-11 RX ORDER — OMEPRAZOLE 20 MG/1
20 CAPSULE, DELAYED RELEASE ORAL
Qty: 30 CAPSULE | Refills: 0 | Status: SHIPPED | OUTPATIENT
Start: 2024-07-11

## 2024-07-11 SDOH — ECONOMIC STABILITY: INCOME INSECURITY: HOW HARD IS IT FOR YOU TO PAY FOR THE VERY BASICS LIKE FOOD, HOUSING, MEDICAL CARE, AND HEATING?: NOT HARD AT ALL

## 2024-07-11 SDOH — ECONOMIC STABILITY: FOOD INSECURITY: WITHIN THE PAST 12 MONTHS, THE FOOD YOU BOUGHT JUST DIDN'T LAST AND YOU DIDN'T HAVE MONEY TO GET MORE.: NEVER TRUE

## 2024-07-11 SDOH — ECONOMIC STABILITY: FOOD INSECURITY: WITHIN THE PAST 12 MONTHS, YOU WORRIED THAT YOUR FOOD WOULD RUN OUT BEFORE YOU GOT MONEY TO BUY MORE.: NEVER TRUE

## 2024-07-11 ASSESSMENT — PATIENT HEALTH QUESTIONNAIRE - PHQ9
SUM OF ALL RESPONSES TO PHQ QUESTIONS 1-9: 0
SUM OF ALL RESPONSES TO PHQ QUESTIONS 1-9: 0
2. FEELING DOWN, DEPRESSED OR HOPELESS: NOT AT ALL
1. LITTLE INTEREST OR PLEASURE IN DOING THINGS: NOT AT ALL
SUM OF ALL RESPONSES TO PHQ9 QUESTIONS 1 & 2: 0
SUM OF ALL RESPONSES TO PHQ QUESTIONS 1-9: 0
SUM OF ALL RESPONSES TO PHQ QUESTIONS 1-9: 0

## 2024-07-11 NOTE — PROGRESS NOTES
daily 5/30/24  Yes Onofre Best MD   fluticasone (FLONASE) 50 MCG/ACT nasal spray Instil one spray in each nostril once daily 11/24/23  Yes Ashley Tamayo APRN - [facility]                I have reviewed the patient's medical history in detail and updated the computerized patient record.      OBJECTIVE    Vitals:    07/11/24 1250   BP: 126/74   Pulse: 83   SpO2: 100%   Weight: 71.7 kg (158 lb)   Height: 1.651 m (5' 5\")       Physical Exam  Vitals and nursing note reviewed.   Constitutional:       General: She is not in acute distress.     Appearance: Normal appearance.   Cardiovascular:      Rate and Rhythm: Normal rate and regular rhythm.      Heart sounds: Normal heart sounds.   Pulmonary:      Effort: Pulmonary effort is normal. No respiratory distress.      Breath sounds: Normal breath sounds.   Abdominal:      General: Bowel sounds are normal. There is no distension.      Palpations: Abdomen is soft. There is no mass.      Tenderness: There is no abdominal tenderness. There is no guarding or rebound.      Hernia: No hernia is present.   Musculoskeletal:      Cervical back: Normal range of motion and neck supple.   Lymphadenopathy:      Cervical: No cervical adenopathy.   Skin:     General: Skin is warm and dry.   Neurological:      Mental Status: She is alert and oriented to person, place, and time.   Psychiatric:         Mood and Affect: Mood normal.         Thought Content: Thought content normal.           ASSESSMENT/ PLAN    1. Acid indigestion  New. Start on ppi x1 month and f/u in 6 weeks. Avoid acidic and spicy, fatty foods, eating late. Has not seen gi. No red flag symptoms.   - omeprazole (PRILOSEC) 20 MG delayed release capsule; Take 1 capsule by mouth every morning (before breakfast)  Dispense: 30 capsule; Refill: 0    2. Hyperlipidemia, unspecified hyperlipidemia type  Chronic, found in 2022. Put on statin. Did take for a yr, but took self off. No side effects, concerns over possible dementia

## 2024-07-24 DIAGNOSIS — R00.2 PALPITATION: ICD-10-CM

## 2024-07-24 NOTE — TELEPHONE ENCOUNTER
Requesting medication refill. Please approve or deny this request.    Rx requested:  Requested Prescriptions     Pending Prescriptions Disp Refills    metoprolol tartrate (LOPRESSOR) 25 MG tablet 30 tablet 0     Sig: Take 0.5 tablets by mouth 2 times daily         Last Office Visit:   6/14/2024      Next Visit Date:  Future Appointments   Date Time Provider Department Center   8/22/2024  3:30 PM Jesusita Correa APRN - CNP [facility]   12/13/2024  3:30 PM Best, Onofre, MD [facility]

## 2024-08-26 ENCOUNTER — TELEPHONE (OUTPATIENT)
Dept: CARDIOLOGY CLINIC | Age: 54
End: 2024-08-26

## 2024-08-26 NOTE — TELEPHONE ENCOUNTER
Pt called in and want to know if she needs to be on cholesterol medication because she said her cholesterol is at 300, she said she had a screening done at work the other day and that's how she had found out. I moved pts appt to October 4th. Please advise

## 2024-08-28 ENCOUNTER — TELEPHONE (OUTPATIENT)
Dept: INTERNAL MEDICINE | Age: 54
End: 2024-08-28

## 2024-08-28 NOTE — TELEPHONE ENCOUNTER
Please call pt to get her set up for an appt. She missed her last and has been trying to call thru our on call line without luck.

## 2024-08-29 NOTE — TELEPHONE ENCOUNTER
Please call again after 4pm. 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

## 2024-08-30 NOTE — TELEPHONE ENCOUNTER
She was having difficulty with her my chart- she couldn't access it. 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  I don't think she will receive a [employer] message. Her mom is also my pt and mentioned she was having trouble with her email.

## 2024-10-03 NOTE — PROGRESS NOTES
Chief Complaint   Patient presents with    Left Knee - Worker's Compensation     Xray today      History of Present Illness:  Marie Ramirez is a 53 y.o. female presenting to clinic with complaints of left knee pain. 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 She states that she was kneeling down and felt a pop in her knee. She has mechanical symptoms with catching and popping as well as twist or pivot. The catching occasionally causes pain. She states that 3-4 days after the injury her calf muscle was tight and cramping but it only lasted a day then got better.     Imaging:  X-rays left knee: No acute fractures or dislocations.  No arthritic change and well-preserved joint space    Assessment:   Left knee internal derangement possible meniscus tear    Plan:  We reviewed the role of imaging, physical therapy, injections and the time frame to healing and correlation with outcome.  Medrol Dosepak  NSAID: Ibuprofen 800 mg sent to pharmacy.  GI side effects and medical risks discussed  Ice: 60 minutes on and off  Pre-cert for knee brace  Exercise home program: Medically directed knee therapy / Handout given  MRI of left knee for meniscus tear. Follow-up after MRI.      Physical Exam:  Well-nourished, well-developed. No acute distress. Alert and oriented x3. Responds appropriately to questioning. Good mood. Normal affect.  Physical Exam  Left Knee:  +1 MCL good endpoint  Flexion to 120  Positive Devika´s test/Positive Apley Grind  Neurovascular exam normal distally  Palpable pedal pulse and good cap refill     Review of Systems:  GENERAL: Negative for malaise, significant weight loss, fever  MUSCULOSKELETAL: See HPI  NEURO:  Negative     No past medical history on file.    Medication Documentation Review Audit    **Prior to Admission medications have not yet been reviewed**         Not on File    Social History     Socioeconomic History    Marital status:      Spouse name: Not on file    Number of children: Not on  file    Years of education: Not on file    Highest education level: Not on file   Occupational History    Not on file   Tobacco Use    Smoking status: Not on file    Smokeless tobacco: Not on file   Substance and Sexual Activity    Alcohol use: Not on file    Drug use: Not on file    Sexual activity: Not on file   Other Topics Concern    Not on file   Social History Narrative    Not on file     Social Determinants of Health     Financial Resource Strain: Low Risk  (7/11/2024)    Received from [facility]    Overall Financial Resource Strain (CARDIA)     Difficulty of Paying Living Expenses: Not hard at all   Food Insecurity: No Food Insecurity (7/11/2024)    Received from [facility]    Hunger Vital Sign     Worried About Running Out of Food in the Last Year: Never true     Ran Out of Food in the Last Year: Never true   Transportation Needs: Unknown (7/11/2024)    Received from [facility]    PRAPARE - Transportation     Lack of Transportation (Medical): Not on file     Lack of Transportation (Non-Medical): No   Physical Activity: Not on file   Stress: Not on file   Social Connections: Not on file   Intimate Partner Violence: Not on file   Housing Stability: Not on file       No past surgical history on file.    No results found.       Scribe Attestation  By signing my name below, Chanda ALMANZA Scribe   attest that this documentation has been prepared under the direction and in the presence of Miki Yi MD.

## 2024-10-04 ENCOUNTER — OFFICE VISIT (OUTPATIENT)
Dept: CARDIOLOGY CLINIC | Age: 54
End: 2024-10-04
Payer: COMMERCIAL

## 2024-10-04 VITALS
WEIGHT: 154 LBS | HEART RATE: 84 BPM | BODY MASS INDEX: 25.63 KG/M2 | OXYGEN SATURATION: 100 % | SYSTOLIC BLOOD PRESSURE: 120 MMHG | DIASTOLIC BLOOD PRESSURE: 79 MMHG

## 2024-10-04 DIAGNOSIS — R00.2 PALPITATIONS: Primary | ICD-10-CM

## 2024-10-04 PROCEDURE — G8427 DOCREV CUR MEDS BY ELIG CLIN: HCPCS | Performed by: INTERNAL MEDICINE

## 2024-10-04 PROCEDURE — G8484 FLU IMMUNIZE NO ADMIN: HCPCS | Performed by: INTERNAL MEDICINE

## 2024-10-04 PROCEDURE — 99214 OFFICE O/P EST MOD 30 MIN: CPT | Performed by: INTERNAL MEDICINE

## 2024-10-04 PROCEDURE — G8419 CALC BMI OUT NRM PARAM NOF/U: HCPCS | Performed by: INTERNAL MEDICINE

## 2024-10-04 PROCEDURE — 3017F COLORECTAL CA SCREEN DOC REV: CPT | Performed by: INTERNAL MEDICINE

## 2024-10-04 PROCEDURE — 1036F TOBACCO NON-USER: CPT | Performed by: INTERNAL MEDICINE

## 2024-10-04 RX ORDER — ATORVASTATIN CALCIUM 20 MG/1
20 TABLET, FILM COATED ORAL DAILY
Qty: 90 TABLET | Refills: 5 | Status: SHIPPED | OUTPATIENT
Start: 2024-10-04

## 2024-10-04 ASSESSMENT — ENCOUNTER SYMPTOMS
ABDOMINAL PAIN: 0
CHEST TIGHTNESS: 0
COLOR CHANGE: 0
CONSTIPATION: 0
WHEEZING: 0
NAUSEA: 0
COUGH: 0
VOMITING: 0
APNEA: 0
RHINORRHEA: 0
EYE REDNESS: 0
SHORTNESS OF BREATH: 0
DIARRHEA: 0

## 2024-10-04 NOTE — PROGRESS NOTES
Chief Complaint   Patient presents with    6 Month Follow-Up       Patient presents for initial medical evaluation. Patient is followed on a regular basis by Jesusita Redd, APRN - CNP.     Hyperlipidemia    Cardiac studies  TTE 1/3/2023 EF 65% no mitral valve prolapse  7-day event recorder 1/3/2023.  Benign, no major arrhythmias, 1 episode of SVT 8 beats.  At that time patient felt palpitations  Exercise stress test 10/6/2022 patient exercised for 5 minutes and 8 seconds.  No ischemia or major arrhythmias  ============  10/4/2024  Follow-up on palpitations  Patient reports the palpitations are getting better  Patient still taking metoprolol  Shortness of breath also improving  However patient underwent a lipid panel checkup and she was told that the the total cholesterol was in the 300s  Patient had stopped statins because she was afraid of a side effect of dementia  Patient walks 3-4 times per week she walks for about half hour she feels well with this activity    6/14/2024  Follow-up on palpitations  Patient continues taking metoprolol, reports that these palpitations are better  Patient just returned from a trip to [place]  EKG today sinus rhythm without signs of ischemia or major arrhythmia  Patient walks 2-3 times per week for 20 to 30 minutes  Patient not taking statins because she is concerned of getting dementia  We will check cholesterol next appointment in 6 months    3/6/2023  Follow-up on palpitations  Patient reports that after starting metoprolol her palpitations have been improving  Patient underwent TTE with normal EF 65%  7 date event recorder was benign with only 1 episode of SVT 8 beats no A-fib no V. tach  Patient exercises 4-5 times per week for at least half hour patient walks briskly or jogs at times without cardiac issues    12/5/2022  First clinic visit referred to our office for palpitations  Patient reports that she has been complaining of palpitations for the last 20 years  These

## 2024-10-08 ENCOUNTER — HOSPITAL ENCOUNTER (OUTPATIENT)
Dept: RADIOLOGY | Facility: CLINIC | Age: 54
Discharge: HOME | End: 2024-10-08
Payer: COMMERCIAL

## 2024-10-08 ENCOUNTER — OFFICE VISIT (OUTPATIENT)
Dept: ORTHOPEDIC SURGERY | Facility: CLINIC | Age: 54
End: 2024-10-08
Payer: COMMERCIAL

## 2024-10-08 DIAGNOSIS — M25.562 LEFT KNEE PAIN, UNSPECIFIED CHRONICITY: ICD-10-CM

## 2024-10-08 DIAGNOSIS — S83.242A TEAR OF MEDIAL MENISCUS OF LEFT KNEE, CURRENT, UNSPECIFIED TEAR TYPE, INITIAL ENCOUNTER: ICD-10-CM

## 2024-10-08 PROCEDURE — 73564 X-RAY EXAM KNEE 4 OR MORE: CPT | Mod: LT

## 2024-10-08 PROCEDURE — 99204 OFFICE O/P NEW MOD 45 MIN: CPT | Performed by: ORTHOPAEDIC SURGERY

## 2024-10-08 PROCEDURE — 99214 OFFICE O/P EST MOD 30 MIN: CPT | Performed by: ORTHOPAEDIC SURGERY

## 2024-10-08 RX ORDER — METHYLPREDNISOLONE 4 MG/1
TABLET ORAL
Qty: 21 TABLET | Refills: 0 | Status: SHIPPED | OUTPATIENT
Start: 2024-10-08

## 2024-10-08 RX ORDER — IBUPROFEN 800 MG/1
800 TABLET ORAL 3 TIMES DAILY
Qty: 90 TABLET | Refills: 0 | Status: SHIPPED | OUTPATIENT
Start: 2024-10-08 | End: 2024-11-07

## 2024-10-09 ENCOUNTER — TELEPHONE (OUTPATIENT)
Dept: ORTHOPEDIC SURGERY | Facility: CLINIC | Age: 54
End: 2024-10-09
Payer: COMMERCIAL

## 2024-10-09 NOTE — TELEPHONE ENCOUNTER
Pt LVM stating since her knee exam she has been having a great deal of pain and has been using crutches to get around. She is still waiting for approval for the MRI. She is asking if she is to be non-weightbearing and should she be working.

## 2024-10-17 ENCOUNTER — TELEPHONE (OUTPATIENT)
Dept: ORTHOPEDIC SURGERY | Facility: CLINIC | Age: 54
End: 2024-10-17
Payer: COMMERCIAL

## 2024-10-17 NOTE — TELEPHONE ENCOUNTER
10/17/24 -  [facility] knee brace - all paperwork faxed to [facility] (augusto at [facility]) same day

## 2024-10-18 ENCOUNTER — TELEPHONE (OUTPATIENT)
Dept: ORTHOPEDIC SURGERY | Facility: CLINIC | Age: 54
End: 2024-10-18
Payer: COMMERCIAL

## 2024-10-18 NOTE — TELEPHONE ENCOUNTER
10/18/24 - freesport lateral stabilizer approved by [facility]  Auth by Julianne Amos - case associate

## 2024-10-21 ENCOUNTER — TELEPHONE (OUTPATIENT)
Dept: ORTHOPEDIC SURGERY | Facility: CLINIC | Age: 54
End: 2024-10-21
Payer: COMMERCIAL

## 2024-10-21 NOTE — TELEPHONE ENCOUNTER
10/21/24  spoke w/ pt re  approval from [facility] for knee brace.  Scheduled for fitting on 10/28/24 in [place]

## 2024-10-23 ENCOUNTER — HOSPITAL ENCOUNTER (OUTPATIENT)
Dept: RADIOLOGY | Facility: CLINIC | Age: 54
Discharge: HOME | End: 2024-10-23
Payer: COMMERCIAL

## 2024-10-23 ENCOUNTER — APPOINTMENT (OUTPATIENT)
Dept: RADIOLOGY | Facility: HOSPITAL | Age: 54
End: 2024-10-23

## 2024-10-23 DIAGNOSIS — S83.242A TEAR OF MEDIAL MENISCUS OF LEFT KNEE, CURRENT, UNSPECIFIED TEAR TYPE, INITIAL ENCOUNTER: ICD-10-CM

## 2024-10-23 PROCEDURE — 73721 MRI JNT OF LWR EXTRE W/O DYE: CPT | Mod: LT

## 2024-10-28 ENCOUNTER — CLINICAL SUPPORT (OUTPATIENT)
Dept: ORTHOPEDIC SURGERY | Facility: CLINIC | Age: 54
End: 2024-10-28
Payer: COMMERCIAL

## 2024-10-29 ENCOUNTER — OFFICE VISIT (OUTPATIENT)
Dept: ORTHOPEDIC SURGERY | Facility: CLINIC | Age: 54
End: 2024-10-29
Payer: COMMERCIAL

## 2024-10-29 DIAGNOSIS — M25.562 LEFT KNEE PAIN, UNSPECIFIED CHRONICITY: Primary | ICD-10-CM

## 2024-10-29 PROCEDURE — 99214 OFFICE O/P EST MOD 30 MIN: CPT | Performed by: ORTHOPAEDIC SURGERY

## 2024-11-05 ENCOUNTER — APPOINTMENT (OUTPATIENT)
Dept: ORTHOPEDIC SURGERY | Facility: CLINIC | Age: 54
End: 2024-11-05
Payer: COMMERCIAL

## 2024-11-21 ENCOUNTER — TELEPHONE (OUTPATIENT)
Dept: INTERNAL MEDICINE | Age: 54
End: 2024-11-21

## 2025-02-04 ENCOUNTER — APPOINTMENT (OUTPATIENT)
Dept: ORTHOPEDIC SURGERY | Facility: CLINIC | Age: 55
End: 2025-02-04
Payer: COMMERCIAL

## 2025-02-11 ENCOUNTER — APPOINTMENT (OUTPATIENT)
Dept: ORTHOPEDIC SURGERY | Facility: CLINIC | Age: 55
End: 2025-02-11
Payer: COMMERCIAL

## 2025-04-02 DIAGNOSIS — R00.2 PALPITATIONS: ICD-10-CM

## 2025-04-02 LAB
CHOLEST SERPL-MCNC: 160 MG/DL (ref 0–199)
HDLC SERPL-MCNC: 47 MG/DL (ref 40–59)
LDLC SERPL CALC-MCNC: 97 MG/DL (ref 0–129)
TRIGL SERPL-MCNC: 79 MG/DL (ref 0–150)

## 2025-04-04 ENCOUNTER — OFFICE VISIT (OUTPATIENT)
Age: 55
End: 2025-04-04
Payer: COMMERCIAL

## 2025-04-04 VITALS
BODY MASS INDEX: 24.96 KG/M2 | RESPIRATION RATE: 16 BRPM | HEART RATE: 74 BPM | OXYGEN SATURATION: 99 % | SYSTOLIC BLOOD PRESSURE: 120 MMHG | WEIGHT: 150 LBS | DIASTOLIC BLOOD PRESSURE: 64 MMHG

## 2025-04-04 DIAGNOSIS — R00.2 PALPITATIONS: Primary | ICD-10-CM

## 2025-04-04 PROCEDURE — G8427 DOCREV CUR MEDS BY ELIG CLIN: HCPCS | Performed by: INTERNAL MEDICINE

## 2025-04-04 PROCEDURE — 99214 OFFICE O/P EST MOD 30 MIN: CPT | Performed by: INTERNAL MEDICINE

## 2025-04-04 PROCEDURE — 1036F TOBACCO NON-USER: CPT | Performed by: INTERNAL MEDICINE

## 2025-04-04 PROCEDURE — 3017F COLORECTAL CA SCREEN DOC REV: CPT | Performed by: INTERNAL MEDICINE

## 2025-04-04 PROCEDURE — G8420 CALC BMI NORM PARAMETERS: HCPCS | Performed by: INTERNAL MEDICINE

## 2025-04-04 ASSESSMENT — ENCOUNTER SYMPTOMS
CONSTIPATION: 0
VOMITING: 0
SHORTNESS OF BREATH: 0
EYE REDNESS: 0
CHEST TIGHTNESS: 0
WHEEZING: 0
COUGH: 0
NAUSEA: 0
COLOR CHANGE: 0
RHINORRHEA: 0
ABDOMINAL PAIN: 0
DIARRHEA: 0
APNEA: 0

## 2025-04-04 NOTE — PROGRESS NOTES
Maternal Grandfather        Current Outpatient Medications   Medication Sig Dispense Refill    atorvastatin (LIPITOR) 20 MG tablet Take 1 tablet by mouth daily 90 tablet 5    metoprolol tartrate (LOPRESSOR) 25 MG tablet Take 0.5 tablets by mouth 2 times daily 90 tablet 3    omeprazole (PRILOSEC) 20 MG delayed release capsule Take 1 capsule by mouth every morning (before breakfast) (Patient not taking: Reported on 10/4/2024) 30 capsule 0    fluticasone (FLONASE) 50 MCG/ACT nasal spray Instil one spray in each nostril once daily (Patient not taking: Reported on 10/4/2024) 16 g 1     No current facility-administered medications for this visit.       Penicillins    Review of Systems:  Review of Systems   Constitutional:  Negative for activity change, chills, diaphoresis and fever.   HENT:  Negative for congestion, ear pain, nosebleeds and rhinorrhea.    Eyes:  Negative for redness and visual disturbance.   Respiratory:  Negative for apnea, cough, chest tightness, shortness of breath and wheezing.    Cardiovascular:  Positive for palpitations. Negative for chest pain and leg swelling.   Gastrointestinal:  Negative for abdominal pain, constipation, diarrhea, nausea and vomiting.   Genitourinary:  Negative for difficulty urinating and dysuria.   Musculoskeletal: Negative.  Negative for joint swelling.   Skin:  Negative for color change, rash and wound.   Neurological:  Negative for dizziness, syncope, weakness, numbness and headaches.   Psychiatric/Behavioral: Negative.          VITALS:  Blood pressure 120/64, pulse 74, resp. rate 16, weight 68 kg (150 lb), SpO2 99%.  Body mass index is 24.96 kg/m².    Physical Examination:  Physical Exam  Vitals and nursing note reviewed.   Constitutional:       Appearance: Normal appearance.   HENT:      Head: Normocephalic and atraumatic.      Mouth/Throat:      Mouth: Mucous membranes are moist.      Pharynx: Oropharynx is clear.   Eyes:      Extraocular Movements: Extraocular

## 2025-07-18 ENCOUNTER — COMMUNITY OUTREACH (OUTPATIENT)
Dept: INTERNAL MEDICINE | Age: 55
End: 2025-07-18

## 2025-07-27 DIAGNOSIS — R00.2 PALPITATION: ICD-10-CM

## 2025-07-28 RX ORDER — METOPROLOL TARTRATE 25 MG/1
TABLET, FILM COATED ORAL
Qty: 90 TABLET | Refills: 3 | Status: SHIPPED | OUTPATIENT
Start: 2025-07-28

## 2025-07-28 RX ORDER — ATORVASTATIN CALCIUM 20 MG/1
20 TABLET, FILM COATED ORAL DAILY
Qty: 90 TABLET | Refills: 3 | Status: SHIPPED | OUTPATIENT
Start: 2025-07-28

## 2025-07-28 NOTE — TELEPHONE ENCOUNTER
Requesting medication refill. Please approve or deny this request.    Rx requested:  Requested Prescriptions     Pending Prescriptions Disp Refills    metoprolol tartrate (LOPRESSOR) 25 MG tablet [Pharmacy Med Name: metoprolol tartrate 25 mg tablet] 90 tablet 3     Sig: TAKE 1/2 (ONE-HALF) OF A TABLET BY MOUTH TWICE DAILY    atorvastatin (LIPITOR) 20 MG tablet [Pharmacy Med Name: atorvastatin 20 mg tablet] 90 tablet 3     Sig: TAKE 1 TABLET BY MOUTH ONCE DAILY         Last Office Visit:   4/4/2025      Next Visit Date:  Future Appointments   Date Time Provider Department Center   4/3/2026  3:30 PM Best, Onofre, MD [facility]               Please approve or deny.